# Patient Record
Sex: MALE | Race: ASIAN | NOT HISPANIC OR LATINO
[De-identification: names, ages, dates, MRNs, and addresses within clinical notes are randomized per-mention and may not be internally consistent; named-entity substitution may affect disease eponyms.]

---

## 2023-02-27 PROBLEM — Z00.00 ENCOUNTER FOR PREVENTIVE HEALTH EXAMINATION: Status: ACTIVE | Noted: 2023-02-27

## 2023-02-28 ENCOUNTER — NON-APPOINTMENT (OUTPATIENT)
Age: 43
End: 2023-02-28

## 2023-02-28 ENCOUNTER — APPOINTMENT (OUTPATIENT)
Dept: ORTHOPEDIC SURGERY | Facility: CLINIC | Age: 43
End: 2023-02-28
Payer: COMMERCIAL

## 2023-02-28 VITALS
SYSTOLIC BLOOD PRESSURE: 135 MMHG | DIASTOLIC BLOOD PRESSURE: 83 MMHG | HEIGHT: 68 IN | WEIGHT: 180 LBS | HEART RATE: 83 BPM | BODY MASS INDEX: 27.28 KG/M2

## 2023-02-28 DIAGNOSIS — S86.112A STRAIN OF OTHER MUSCLE(S) AND TENDON(S) OF POSTERIOR MUSCLE GROUP AT LOWER LEG LEVEL, LEFT LEG, INITIAL ENCOUNTER: ICD-10-CM

## 2023-02-28 PROCEDURE — 99202 OFFICE O/P NEW SF 15 MIN: CPT

## 2023-02-28 NOTE — HISTORY OF PRESENT ILLNESS
[de-identified] : Patient presents with 4 days of left knee/calf pain.  States he was snowboarding and fell I feel like he hyperextended his leg.  Immediately had pain in the posterior aspect of his knee and proximal calf.  Took Advil 1 time but did not help.  He tries to avoid taking medications.  No numbness or tingling.  Never had any prior issues.  Denies past medical history or history of blood clots or blood thinners.

## 2023-02-28 NOTE — PHYSICAL EXAM
[de-identified] : General Appearance: normal without acute distress\par Mental: Alert and oriented x 3\par Psych/affect: appropriate, cooperative\par Gait: Mildly antalgic gait\par \par Left lower extremity\par Skin intact, no ecchymosis\par Mildly tender to palpation posterior medial aspect of knee\par Mild discomfort with full extension and resisted plantarflexion, no pain with dorsiflexion [de-identified] : Patient declined x-ray imaging at this time

## 2023-02-28 NOTE — DISCUSSION/SUMMARY
[de-identified] : Left likely gastroc strain\par \par Extensive discussion of the natural history of this issue was had with the patient.  We discussed the treatment options focusing on conservative therapy which includes anti-inflammatories, physical therapy/home exercise, & activity modification.  Patient declined formal physical therapy at this time.  Recommend he avoid strenuous exercise and activities.  Recommend knee sleeve for comfort.  All questions answered.  Patient to follow-up if he is not improving.